# Patient Record
(demographics unavailable — no encounter records)

---

## 2024-11-11 NOTE — PHYSICAL EXAM
[No Acute Distress] : no acute distress [Well Nourished] : well nourished [Well Developed] : well developed [Well-Appearing] : well-appearing [Normal Sclera/Conjunctiva] : normal sclera/conjunctiva [PERRL] : pupils equal round and reactive to light [EOMI] : extraocular movements intact [Normal Outer Ear/Nose] : the outer ears and nose were normal in appearance [Normal Oropharynx] : the oropharynx was normal [No JVD] : no jugular venous distention [No Lymphadenopathy] : no lymphadenopathy [No Respiratory Distress] : no respiratory distress  [No Accessory Muscle Use] : no accessory muscle use [Clear to Auscultation] : lungs were clear to auscultation bilaterally [Normal Rate] : normal rate  [Regular Rhythm] : with a regular rhythm [Soft] : abdomen soft [Normal Bowel Sounds] : normal bowel sounds [Coordination Grossly Intact] : coordination grossly intact [No Focal Deficits] : no focal deficits [Normal Gait] : normal gait [Speech Grossly Normal] : speech grossly normal [Normal Affect] : the affect was normal [Normal Mood] : the mood was normal [Normal Insight/Judgement] : insight and judgment were intact

## 2024-11-12 NOTE — ADDENDUM
[FreeTextEntry1] : 11/12/24- results reviewed and pt is medically optimized for this procedure and is an acceptable risk. pt verbalized understanding of risks of this procedure. Per pt, her surgeon informed her that getting this medical clearance early is acceptable.

## 2024-11-12 NOTE — HISTORY OF PRESENT ILLNESS
[(Patient denies any chest pain, claudication, dyspnea on exertion, orthopnea, palpitations or syncope)] : Patient denies any chest pain, claudication, dyspnea on exertion, orthopnea, palpitations or syncope [Good (7-10 METs)] : Good (7-10 METs) [Aortic Stenosis] : no aortic stenosis [Atrial Fibrillation] : no atrial fibrillation [Coronary Artery Disease] : no coronary artery disease [Recent Myocardial Infarction] : no recent myocardial infarction [Implantable Device/Pacemaker] : no implantable device/pacemaker [Asthma] : no asthma [COPD] : no COPD [Sleep Apnea] : no sleep apnea [Smoker] : not a smoker [Family Member] : no family member with adverse anesthesia reaction/sudden death [Self] : no previous adverse anesthesia reaction [Chronic Anticoagulation] : no chronic anticoagulation [Chronic Kidney Disease] : no chronic kidney disease [Diabetes] : no diabetes [FreeTextEntry1] : Face lift procedure/cosmetic [FreeTextEntry2] : 01/22/25 [FreeTextEntry3] : Dr. Pedro Mendez [FreeTextEntry4] : Pt says the plastic surgeon wants this medical clearance 2 months before the procedure. Pt feeling well today with no other complaints. Denies chest pain, sob, headaches, sore throat, visual disturbances, fever, chills, nausea, vomiting, abdominal pain.

## 2024-12-02 NOTE — PROCEDURE
[FreeTextEntry1] : botox [FreeTextEntry2] : chronic migraines [FreeTextEntry4] : none [FreeTextEntry3] : Patient was consented with explanation of risks and benefits including black box warnings and explanation of alternative treatments.   Patient was injected in the sitting position  Muscles injected: Procerus - 5 units  - 5 units in each side = 10 units Frontalis - 5 units in 2 injections on each side = 20 units Temporalis - 5 units in 4 injection in each side = 40 units Occipitalis - 5 units in 3 injections on each side = 30 units Cervical paraspinals - 5 units in 2 injections on each side = 20 Trapezius - 5 units in 3 injections on each side = 30  Total used 155 units Onabotulinum toxin Total wasted = 45 Total billable = 200 units  Patient tolerated procedure well with minimal pain and bleeding.